# Patient Record
Sex: MALE | Race: ASIAN | NOT HISPANIC OR LATINO | Employment: FULL TIME | URBAN - METROPOLITAN AREA
[De-identification: names, ages, dates, MRNs, and addresses within clinical notes are randomized per-mention and may not be internally consistent; named-entity substitution may affect disease eponyms.]

---

## 2017-11-08 ENCOUNTER — OFFICE VISIT (OUTPATIENT)
Dept: URGENT CARE | Facility: URGENT CARE | Age: 27
End: 2017-11-08
Payer: COMMERCIAL

## 2017-11-08 VITALS
HEART RATE: 68 BPM | OXYGEN SATURATION: 99 % | SYSTOLIC BLOOD PRESSURE: 116 MMHG | DIASTOLIC BLOOD PRESSURE: 68 MMHG | TEMPERATURE: 99.2 F | WEIGHT: 157.4 LBS

## 2017-11-08 DIAGNOSIS — R23.8 VESICULAR RASH: Primary | ICD-10-CM

## 2017-11-08 PROCEDURE — 99203 OFFICE O/P NEW LOW 30 MIN: CPT | Performed by: PHYSICIAN ASSISTANT

## 2017-11-08 PROCEDURE — 87798 DETECT AGENT NOS DNA AMP: CPT | Performed by: PHYSICIAN ASSISTANT

## 2017-11-08 PROCEDURE — 36415 COLL VENOUS BLD VENIPUNCTURE: CPT | Performed by: PHYSICIAN ASSISTANT

## 2017-11-08 RX ORDER — VALACYCLOVIR HYDROCHLORIDE 1 G/1
1000 TABLET, FILM COATED ORAL 3 TIMES DAILY
Qty: 21 TABLET | Refills: 0 | Status: SHIPPED | OUTPATIENT
Start: 2017-11-08 | End: 2024-03-25

## 2017-11-08 RX ORDER — HYDROXYZINE HYDROCHLORIDE 25 MG/1
25-50 TABLET, FILM COATED ORAL EVERY 6 HOURS PRN
Qty: 60 TABLET | Refills: 0 | Status: SHIPPED | OUTPATIENT
Start: 2017-11-08 | End: 2024-03-25

## 2017-11-08 NOTE — LETTER
[place] [facility]  [address] [address]  [phone]      November 8, 2017    RE:  Zen Tenorio                                                                                                                                                       [address]  [facility] [address]            To whom it may concern:    Zen Tenorio is under my professional care for a potentially contagious vesicular rash.  He may return to work after all of the lesions have crusted over.  This may take up to a week.          Sincerely,        Zelda Greer [facility]

## 2017-11-08 NOTE — NURSING NOTE
Chief Complaint   Patient presents with     Derm Problem     rash and blisters all over body x 2 days        Initial /68  Pulse 68  Temp 99.2  F (37.3  C) (Oral)  Wt 157 lb 6.4 oz (71.4 kg)  SpO2 99% There is no height or weight on file to calculate BMI.  Medication Reconciliation: complete

## 2017-11-08 NOTE — PATIENT INSTRUCTIONS
Chickenpox (Adult)  Chickenpox is a very contagious illness caused by a virus. Symptoms include fever and an itchy red rash of small blisters that form all over the body. Blisters may appear on the scalp, face, and in the mouth. Women may also get blisters in the vaginal area. New blisters will appear over the first several days. The contagious period ends when all blisters have crusted over. This is usually about 1 week after the illness begins.  Most children older than 1 year and adults who have never had chickenpox can be vaccinated to prevent this illness.  The illness usually goes away in a few days, but the virus that causes chickenpox remains in the body. Many years or decades later this can result in shingles.   Home care  Follow these guidelines when caring for yourself at home:    You may use acetaminophen or ibuprofen to control pain and fever, unless another medicine was prescribed. If you have chronic liver or kidney disease, talk with your healthcare provider before using these medicines. Also talk with your provider if you ve had a stomach ulcer or GI bleeding. Don t give aspirin to anyone younger age 18 who is ill with a fever. It may cause severe liver damage.    You can relieve itching and pain by mixing cool water with cornstarch, baking soda, and commercial oatmeal bath powder. The oatmeal bath powder is available without a prescription. Use this mixture as a compress for the area. This will soothe the skin. Calamine or another anti-itch lotion may help.    Diphenhydramine is an antihistamine available at grocery stores and pharmacies. You can use this medicine to ease itching over large areas of skin unless a prescription antihistamine was given. Use lower doses during the day and higher doses at bedtime. This is because the drug may make you sleepy. Loratidine, cetirizine, or fexofenadine are other antihistamines that you may use. They cause less drowsiness and are good choices for daytime  use.    Bathe daily. Wash the rash with soap to stop infection.  Follow-up care  Follow up with your healthcare provider, or as advised, if the above tips don t bring relief.  When to seek medical advice  Call your healthcare provider right away if any of these occur:    Signs of skin infection. These include colored drainage from the sores and redness or tenderness of the sores that gets worse.    Excessive vomiting    Severe headache, stiff neck, or confusion    Joint pain, redness, or swelling    Dark urine, or yellow skin or eyes    Leg weakness or trouble walking    Fever of 100.4 F (38 C) or higher that doesn t get better with fever medicine      Date Last Reviewed: 1/1/2017 2000-2017 [employer]. [address] [place] [address], [place] [address]. All rights reserved. This information is not intended as a substitute for professional medical care. Always follow your healthcare professional's instructions.

## 2017-11-08 NOTE — MR AVS SNAPSHOT
After Visit Summary   11/8/2017    Zen Tenorio    MRN: [id number]           Patient Information     Date Of Birth          1990        Visit Information        Provider Department      11/8/2017 10:10 AM Zelda Ocampo PA-C [place] [facility]        Today's Diagnoses     Vesicular rash    -  1      Care Instructions      Chickenpox (Adult)  Chickenpox is a very contagious illness caused by a virus. Symptoms include fever and an itchy red rash of small blisters that form all over the body. Blisters may appear on the scalp, face, and in the mouth. Women may also get blisters in the vaginal area. New blisters will appear over the first several days. The contagious period ends when all blisters have crusted over. This is usually about 1 week after the illness begins.  Most children older than 1 year and adults who have never had chickenpox can be vaccinated to prevent this illness.  The illness usually goes away in a few days, but the virus that causes chickenpox remains in the body. Many years or decades later this can result in shingles.   Home care  Follow these guidelines when caring for yourself at home:    You may use acetaminophen or ibuprofen to control pain and fever, unless another medicine was prescribed. If you have chronic liver or kidney disease, talk with your healthcare provider before using these medicines. Also talk with your provider if you ve had a stomach ulcer or GI bleeding. Don t give aspirin to anyone younger age 18 who is ill with a fever. It may cause severe liver damage.    You can relieve itching and pain by mixing cool water with cornstarch, baking soda, and commercial oatmeal bath powder. The oatmeal bath powder is available without a prescription. Use this mixture as a compress for the area. This will soothe the skin. Calamine or another anti-itch lotion may help.    Diphenhydramine is an antihistamine available at grocery stores  and pharmacies. You can use this medicine to ease itching over large areas of skin unless a prescription antihistamine was given. Use lower doses during the day and higher doses at bedtime. This is because the drug may make you sleepy. Loratidine, cetirizine, or fexofenadine are other antihistamines that you may use. They cause less drowsiness and are good choices for daytime use.    Bathe daily. Wash the rash with soap to stop infection.  Follow-up care  Follow up with your healthcare provider, or as advised, if the above tips don t bring relief.  When to seek medical advice  Call your healthcare provider right away if any of these occur:    Signs of skin infection. These include colored drainage from the sores and redness or tenderness of the sores that gets worse.    Excessive vomiting    Severe headache, stiff neck, or confusion    Joint pain, redness, or swelling    Dark urine, or yellow skin or eyes    Leg weakness or trouble walking    Fever of 100.4 F (38 C) or higher that doesn t get better with fever medicine      Date Last Reviewed: 1/1/2017 2000-2017 [employer]. [address] [place] [address]. All rights reserved. This information is not intended as a substitute for professional medical care. Always follow your healthcare professional's instructions.                Follow-ups after your visit        Who to contact     If you have questions or need follow up information about today's clinic visit or your schedule please contact [facility] [place] directly at [phone].  Normal or non-critical lab and imaging results will be communicated to you by MyChart, letter or phone within 4 business days after the clinic has received the results. If you do not hear from us within 7 days, please contact the clinic through MyChart or phone. If you have a critical or abnormal lab result, we will notify you by phone as soon as possible.  Submit refill requests through  "[employer] or call your pharmacy and they will forward the refill request to us. Please allow 3 business days for your refill to be completed.          Additional Information About Your Visit        MyChart Information     [employer] lets you send messages to your doctor, view your test results, renew your prescriptions, schedule appointments and more. To sign up, go to [email or web address] . Click on \"Log in\" on the left side of the screen, which will take you to the Welcome page. Then click on \"Sign up Now\" on the right side of the page.     You will be asked to enter the access code listed below, as well as some personal information. Please follow the directions to create your username and password.     Your access code is: [email or web address]  Expires: 2018 11:45 AM     Your access code will  in 90 days. If you need help or a new code, please call your [place] clinic or [phone].        Care EveryWhere ID     This is your Care EveryWhere ID. This could be used by other organizations to access your [place] medical records  [id number]        Your Vitals Were     Pulse Temperature Pulse Oximetry             68 99.2  F (37.3  C) (Oral) 99%          Blood Pressure from Last 3 Encounters:   17 116/68    Weight from Last 3 Encounters:   17 157 lb 6.4 oz (71.4 kg)              We Performed the Following     Varicella Zoster DNA PCR CSF or Skin Swab          Today's Medication Changes          These changes are accurate as of: 17 11:45 AM.  If you have any questions, ask your nurse or doctor.               Start taking these medicines.        Dose/Directions    hydrOXYzine 25 MG tablet   Commonly known as:  ATARAX   Used for:  Vesicular rash   Started by:  Zelda Ocampo PA-C        Dose:  25-50 mg   Take 1-2 tablets (25-50 mg) by mouth every 6 hours as needed for itching   Quantity:  60 tablet   Refills:  0       valACYclovir 1000 mg tablet   Commonly known as:  VALTREX   Used for:  " Vesicular rash   Started by:  Zelda Ocampo PA-C        Dose:  1000 mg   Take 1 tablet (1,000 mg) by mouth 3 times daily   Quantity:  21 tablet   Refills:  0            Where to get your medicines      These medications were sent to [facility]/pharmacy #[id number] - [place], [place] - [address]  [address] [facility] [address]     Phone:  [phone]     hydrOXYzine 25 MG tablet    valACYclovir 1000 mg tablet                Primary Care Provider    Physician No Ref-Primary       NO REF-PRIMARY PHYSICIAN        Equal Access to Services     [facility]: Hadii aad ku hadasho Soomaali, waaxda luqadaha, qaybta kaalmada adeegyada, waxmari sunshine . So [facility] [phone].    ATENCIÓN: Si habla español, tiene a aviles disposición servicios gratuitos de asistencia lingüística. [facility] [phone].    We comply with applicable federal civil rights laws and [place] laws. We do not discriminate on the basis of race, color, national origin, age, disability, sex, sexual orientation, or gender identity.            Thank you!     Thank you for choosing [place] [facility]  for your care. Our goal is always to provide you with excellent care. Hearing back from our patients is one way we can continue to improve our services. Please take a few minutes to complete the written survey that you may receive in the mail after your visit with us. Thank you!             Your Updated Medication List - Protect others around you: Learn how to safely use, store and throw away your medicines at www.disposemymeds.org.          This list is accurate as of: 11/8/17 11:45 AM.  Always use your most recent med list.                   Brand Name Dispense Instructions for use Diagnosis    hydrOXYzine 25 MG tablet    ATARAX    60 tablet    Take 1-2 tablets (25-50 mg) by mouth every 6 hours as needed for itching    Vesicular rash       valACYclovir 1000 mg tablet    VALTREX    21  tablet    Take 1 tablet (1,000 mg) by mouth 3 times daily    Vesicular rash

## 2017-11-08 NOTE — PROGRESS NOTES
"SUBJECTIVE:  Zen Tenorio is a 27 year old male who presents to the clinic today for a rash.  Onset was 2 days ago, started on face with blisters, now blisters on trunk.    Fever started 3 days ago.  Low grade.    Rash is slightly itchy.   Previous history of a similar rash? No  Recent exposure history: none known  Recent new medications: None  Associated symptoms include: as above.    Patient did not have varicella as a child.  He moved to [place] from [place] [facility] for work 5 years ago.        No past medical history on file.     Denies any significant PMH     No Known Allergies  Social History   Substance Use Topics     Smoking status: Never Smoker     Smokeless tobacco: Not on file     Alcohol use Not on file       ROS:  CONSTITUTIONAL:as per HPI  INTEGUMENTARY/SKIN: as per HPi  EYES: NEGATIVE for vision changes or irritation  ENT/MOUTH: NEGATIVE for ear, mouth and throat problems  RESP:NEGATIVE for significant cough or SOB  CV: NEGATIVE for chest pain, palpitations or peripheral edema  GI: NEGATIVE for nausea, abdominal pain, heartburn, or change in bowel habits  : negative for dysuria, hematuria, decreased urinary stream, erectile dysfunction    EXAM:   /68  Pulse 68  Temp 99.2  F (37.3  C) (Oral)  Wt 157 lb 6.4 oz (71.4 kg)  SpO2 99%  GENERAL: alert, no acute distress.  SKIN: vesicular lesions \"dew drop on a castillo petal\" appearance, on trunk and face.    GENERAL APPEARANCE: healthy, alert and no distress  EYES: EOMI,  PERRL, conjunctiva clear  NECK: supple, non-tender to palpation, no adenopathy noted  RESP: lungs clear to auscultation - no rales, rhonchi or wheezes  CV: regular rates and rhythm, normal S1 S2, no murmur noted  ABDOMEN:  soft, nontender, no HSM or masses and bowel sounds normal    (R23.8) Vesicular rash  (primary encounter diagnosis)  Comment:   Plan: Varicella Zoster DNA PCR CSF or Skin Swab,         valACYclovir (VALTREX) 1000 mg tablet,         hydrOXYzine (ATARAX) 25 MG tablet, CANCELED: "         Varicella-Zoster Virus Culture ([facility])        Considered contagious.    Handout on Varicella given and reviewed.    Patient expresses understanding and agreement with the assessment and plan as above.

## 2017-11-09 LAB
SPECIMEN TYPE: ABNORMAL
VARICELLA ZOSTER DNA PCR COMMENT: ABNORMAL
VZV DNA SPEC QL NAA+PROBE: ABNORMAL

## 2017-11-16 ENCOUNTER — OFFICE VISIT (OUTPATIENT)
Dept: INTERNAL MEDICINE | Facility: CLINIC | Age: 27
End: 2017-11-16
Payer: COMMERCIAL

## 2017-11-16 VITALS
OXYGEN SATURATION: 100 % | WEIGHT: 145 LBS | HEART RATE: 63 BPM | SYSTOLIC BLOOD PRESSURE: 102 MMHG | HEIGHT: 69 IN | DIASTOLIC BLOOD PRESSURE: 70 MMHG | TEMPERATURE: 98.1 F | BODY MASS INDEX: 21.48 KG/M2

## 2017-11-16 DIAGNOSIS — B01.9 VARICELLA WITHOUT COMPLICATION: Primary | ICD-10-CM

## 2017-11-16 PROCEDURE — 99213 OFFICE O/P EST LOW 20 MIN: CPT | Performed by: PHYSICIAN ASSISTANT

## 2017-11-16 NOTE — MR AVS SNAPSHOT
"              After Visit Summary   2017    Zen Tenorio    MRN: [id number]           Patient Information     Date Of Birth          1990        Visit Information        Provider Department      2017 9:20 AM Alondra Dent PA-C [facility]        Today's Diagnoses     Varicella without complication    -  1       Follow-ups after your visit        Who to contact     If you have questions or need follow up information about today's clinic visit or your schedule please contact [facility] directly at [phone].  Normal or non-critical lab and imaging results will be communicated to you by [employer], letter or phone within 4 business days after the clinic has received the results. If you do not hear from us within 7 days, please contact the clinic through [employer] or phone. If you have a critical or abnormal lab result, we will notify you by phone as soon as possible.  Submit refill requests through [employer] or call your pharmacy and they will forward the refill request to us. Please allow 3 business days for your refill to be completed.          Additional Information About Your Visit        MyChart Information     [employer] lets you send messages to your doctor, view your test results, renew your prescriptions, schedule appointments and more. To sign up, go to [email or web address] . Click on \"Log in\" on the left side of the screen, which will take you to the Welcome page. Then click on \"Sign up Now\" on the right side of the page.     You will be asked to enter the access code listed below, as well as some personal information. Please follow the directions to create your username and password.     Your access code is: [email or web address]  Expires: 2018 11:45 AM     Your access code will  in 90 days. If you need help or a new code, please call your [facility] or [phone].        Care EveryWhere ID     This is your Care EveryWhere ID. " "This could be used by other organizations to access your [place] medical records  [id number]        Your Vitals Were     Pulse Temperature Height Pulse Oximetry BMI (Body Mass Index)       63 98.1  F (36.7  C) (Oral) 5' 9\" (1.753 m) 100% 21.41 kg/m2        Blood Pressure from Last 3 Encounters:   11/16/17 102/70   11/08/17 116/68    Weight from Last 3 Encounters:   11/16/17 145 lb (65.8 kg)   11/08/17 157 lb 6.4 oz (71.4 kg)              Today, you had the following     No orders found for display       Primary Care Provider    Physician No Ref-Primary       NO REF-PRIMARY PHYSICIAN        Equal Access to Services     [facility] : Hadii jared Craft, wawilfredo diggs, mao kaalmada kamaljit, keyona sunshine . So [facility] [phone].    ATENCIÓN: Si habla español, tiene a aviles disposición servicios gratuitos de asistencia lingüística. Llame al [phone].    We comply with applicable federal civil rights laws and [place] laws. We do not discriminate on the basis of race, color, national origin, age, disability, sex, sexual orientation, or gender identity.            Thank you!     Thank you for choosing [facility]  for your care. Our goal is always to provide you with excellent care. Hearing back from our patients is one way we can continue to improve our services. Please take a few minutes to complete the written survey that you may receive in the mail after your visit with us. Thank you!             Your Updated Medication List - Protect others around you: Learn how to safely use, store and throw away your medicines at www.disposemymeds.org.          This list is accurate as of: 11/16/17  2:17 PM.  Always use your most recent med list.                   Brand Name Dispense Instructions for use Diagnosis    hydrOXYzine 25 MG tablet    ATARAX    60 tablet    Take 1-2 tablets (25-50 mg) by mouth every 6 hours as needed for itching    Vesicular rash    "    valACYclovir 1000 mg tablet    VALTREX    21 tablet    Take 1 tablet (1,000 mg) by mouth 3 times daily    Vesicular rash

## 2017-11-16 NOTE — NURSING NOTE
"Chief Complaint   Patient presents with     Urgent Care     f/u       Initial /70  Pulse 63  Temp 98.1  F (36.7  C) (Oral)  Ht 5' 9\" (1.753 m)  Wt 145 lb (65.8 kg)  SpO2 100%  BMI 21.41 kg/m2 Estimated body mass index is 21.41 kg/(m^2) as calculated from the following:    Height as of this encounter: 5' 9\" (1.753 m).    Weight as of this encounter: 145 lb (65.8 kg).  Medication Reconciliation: complete   Kiarra Ramirez MA   "

## 2017-11-16 NOTE — PROGRESS NOTES
"  SUBJECTIVE:   Zen Tenorio is a 27 year old male who presents to clinic today for the following health issues:      ED/UC Followup:    Facility:  [facility]   Date of visit: 11/8/2017  Reason for visit: Varicella   Current Status: Patient only currently has one blister left on his R hand near his thumb. No itchiness or fever noted.   Pt was treated with oral antiviral.    Pt reports he has no idea how he contracted varicella. He notes significant improvement in his symptoms. He reports most of his lesions have crusted over except for the lesion on his right hand. He lives with roommates who have taken steps to get vaccinated. He has remained out of work. Pt's fever and weakness have resolved. He notes his energy seems to be back to normal.  He has no further concerns.     Problem list and histories reviewed & adjusted, as indicated.  Additional history: as documented      Reviewed and updated as needed this visit by clinical staff  Tobacco  Allergies  Meds  Problems       Reviewed and updated as needed this visit by Provider  Meds  Problems         ROS:  Constitutional, HEENT, cardiovascular, pulmonary, gi and gu systems are negative, except as otherwise noted.      OBJECTIVE:   /70  Pulse 63  Temp 98.1  F (36.7  C) (Oral)  Ht 5' 9\" (1.753 m)  Wt 145 lb (65.8 kg)  SpO2 100%  BMI 21.41 kg/m2  Body mass index is 21.41 kg/(m^2).  GENERAL APPEARANCE: healthy, alert and no distress  CV: RRR without rubs, gallops or murmurs  Resp: CTA B/L.   SKIN: lesions on abdomen are scabbed over with no blistering. Lesion on right thumb base is a clear blister on soft castillo colored lesion.     Review of chart shows positive varicella PCR    ASSESSMENT/PLAN:       1. Varicella without complication: healing well without concerns  -continue prevention precautions until all lesions are crusted over, reviewed criteria for not being contagious.   -reviewed risks of ariela the infection to immunocompromised or " unvaccinated individuals  -follow up if lesions start to reoccur, symptoms worsen or fail to completely resolve  -pt agreed to the above plan and all questions are answered     Alondra Dent PA-C  [facility]

## 2024-02-24 ENCOUNTER — HEALTH MAINTENANCE LETTER (OUTPATIENT)
Age: 34
End: 2024-02-24

## 2024-03-25 ENCOUNTER — OFFICE VISIT (OUTPATIENT)
Dept: FAMILY MEDICINE | Facility: CLINIC | Age: 34
End: 2024-03-25
Payer: COMMERCIAL

## 2024-03-25 VITALS
HEART RATE: 61 BPM | SYSTOLIC BLOOD PRESSURE: 113 MMHG | DIASTOLIC BLOOD PRESSURE: 75 MMHG | RESPIRATION RATE: 14 BRPM | BODY MASS INDEX: 23.92 KG/M2 | HEIGHT: 68 IN | TEMPERATURE: 97.7 F | WEIGHT: 157.8 LBS | OXYGEN SATURATION: 97 %

## 2024-03-25 DIAGNOSIS — Z13.220 SCREENING FOR HYPERLIPIDEMIA: ICD-10-CM

## 2024-03-25 DIAGNOSIS — Z23 NEED FOR TDAP VACCINATION: ICD-10-CM

## 2024-03-25 DIAGNOSIS — Z11.4 SCREENING FOR HIV (HUMAN IMMUNODEFICIENCY VIRUS): ICD-10-CM

## 2024-03-25 DIAGNOSIS — Z11.59 NEED FOR HEPATITIS C SCREENING TEST: ICD-10-CM

## 2024-03-25 DIAGNOSIS — Z13.1 SCREENING FOR DIABETES MELLITUS: ICD-10-CM

## 2024-03-25 DIAGNOSIS — Z23 NEED FOR COVID-19 VACCINE: ICD-10-CM

## 2024-03-25 DIAGNOSIS — Z00.00 ROUTINE GENERAL MEDICAL EXAMINATION AT A HEALTH CARE FACILITY: Primary | ICD-10-CM

## 2024-03-25 LAB
ANION GAP SERPL CALCULATED.3IONS-SCNC: 11 MMOL/L (ref 7–15)
BUN SERPL-MCNC: 16.6 MG/DL (ref 6–20)
CALCIUM SERPL-MCNC: 9.6 MG/DL (ref 8.6–10)
CHLORIDE SERPL-SCNC: 103 MMOL/L (ref 98–107)
CHOLEST SERPL-MCNC: 149 MG/DL
CREAT SERPL-MCNC: 1.02 MG/DL (ref 0.67–1.17)
DEPRECATED HCO3 PLAS-SCNC: 27 MMOL/L (ref 22–29)
EGFRCR SERPLBLD CKD-EPI 2021: >90 ML/MIN/1.73M2
ERYTHROCYTE [DISTWIDTH] IN BLOOD BY AUTOMATED COUNT: 12.8 % (ref 10–15)
FASTING STATUS PATIENT QL REPORTED: YES
GLUCOSE SERPL-MCNC: 70 MG/DL (ref 70–99)
HCT VFR BLD AUTO: 42.8 % (ref 40–53)
HCV AB SERPL QL IA: NONREACTIVE
HDLC SERPL-MCNC: 45 MG/DL
HGB BLD-MCNC: 15.5 G/DL (ref 13.3–17.7)
HIV 1+2 AB+HIV1 P24 AG SERPL QL IA: NONREACTIVE
LDLC SERPL CALC-MCNC: 93 MG/DL
MCH RBC QN AUTO: 29.9 PG (ref 26.5–33)
MCHC RBC AUTO-ENTMCNC: 36.2 G/DL (ref 31.5–36.5)
MCV RBC AUTO: 83 FL (ref 78–100)
NONHDLC SERPL-MCNC: 104 MG/DL
PLATELET # BLD AUTO: 142 10E3/UL (ref 150–450)
POTASSIUM SERPL-SCNC: 3.6 MMOL/L (ref 3.4–5.3)
RBC # BLD AUTO: 5.19 10E6/UL (ref 4.4–5.9)
SODIUM SERPL-SCNC: 141 MMOL/L (ref 135–145)
T4 FREE SERPL-MCNC: 1.4 NG/DL (ref 0.9–1.7)
TRIGL SERPL-MCNC: 54 MG/DL
TSH SERPL DL<=0.005 MIU/L-ACNC: 4.56 UIU/ML (ref 0.3–4.2)
WBC # BLD AUTO: 4 10E3/UL (ref 4–11)

## 2024-03-25 PROCEDURE — 80061 LIPID PANEL: CPT

## 2024-03-25 PROCEDURE — 90715 TDAP VACCINE 7 YRS/> IM: CPT

## 2024-03-25 PROCEDURE — 90471 IMMUNIZATION ADMIN: CPT

## 2024-03-25 PROCEDURE — 84443 ASSAY THYROID STIM HORMONE: CPT

## 2024-03-25 PROCEDURE — 91320 SARSCV2 VAC 30MCG TRS-SUC IM: CPT

## 2024-03-25 PROCEDURE — 80048 BASIC METABOLIC PNL TOTAL CA: CPT

## 2024-03-25 PROCEDURE — 84439 ASSAY OF FREE THYROXINE: CPT

## 2024-03-25 PROCEDURE — 99385 PREV VISIT NEW AGE 18-39: CPT | Mod: 25

## 2024-03-25 PROCEDURE — 86803 HEPATITIS C AB TEST: CPT

## 2024-03-25 PROCEDURE — 90480 ADMN SARSCOV2 VAC 1/ONLY CMP: CPT

## 2024-03-25 PROCEDURE — 87389 HIV-1 AG W/HIV-1&-2 AB AG IA: CPT

## 2024-03-25 PROCEDURE — 36415 COLL VENOUS BLD VENIPUNCTURE: CPT

## 2024-03-25 PROCEDURE — 85027 COMPLETE CBC AUTOMATED: CPT

## 2024-03-25 SDOH — HEALTH STABILITY: PHYSICAL HEALTH: ON AVERAGE, HOW MANY MINUTES DO YOU ENGAGE IN EXERCISE AT THIS LEVEL?: 60 MIN

## 2024-03-25 SDOH — HEALTH STABILITY: PHYSICAL HEALTH: ON AVERAGE, HOW MANY DAYS PER WEEK DO YOU ENGAGE IN MODERATE TO STRENUOUS EXERCISE (LIKE A BRISK WALK)?: 4 DAYS

## 2024-03-25 ASSESSMENT — LIFESTYLE VARIABLES
HOW MANY STANDARD DRINKS CONTAINING ALCOHOL DO YOU HAVE ON A TYPICAL DAY: 1 OR 2
HOW OFTEN DO YOU HAVE SIX OR MORE DRINKS ON ONE OCCASION: NEVER
HOW OFTEN DO YOU HAVE A DRINK CONTAINING ALCOHOL: MONTHLY OR LESS
AUDIT-C TOTAL SCORE: 1
SKIP TO QUESTIONS 9-10: 1

## 2024-03-25 ASSESSMENT — SOCIAL DETERMINANTS OF HEALTH (SDOH)
HOW OFTEN DO YOU GET TOGETHER WITH FRIENDS OR RELATIVES?: ONCE A WEEK
ARE YOU MARRIED, WIDOWED, DIVORCED, SEPARATED, NEVER MARRIED, OR LIVING WITH A PARTNER?: NEVER MARRIED
HOW OFTEN DO YOU ATTEND CHURCH OR RELIGIOUS SERVICES?: PATIENT DECLINED
HOW OFTEN DO YOU GET TOGETHER WITH FRIENDS OR RELATIVES?: ONCE A WEEK
IN A TYPICAL WEEK, HOW MANY TIMES DO YOU TALK ON THE PHONE WITH FAMILY, FRIENDS, OR NEIGHBORS?: ONCE A WEEK
HOW OFTEN DO YOU ATTENT MEETINGS OF THE CLUB OR ORGANIZATION YOU BELONG TO?: NEVER
DO YOU BELONG TO ANY CLUBS OR ORGANIZATIONS SUCH AS CHURCH GROUPS UNIONS, FRATERNAL OR ATHLETIC GROUPS, OR SCHOOL GROUPS?: NO

## 2024-03-25 ASSESSMENT — PAIN SCALES - GENERAL: PAINLEVEL: NO PAIN (0)

## 2024-03-25 NOTE — PATIENT INSTRUCTIONS
Preventive Care Advice   This is general advice given by our system to help you stay healthy. However, your care team may have specific advice just for you. Please talk to your care team about your preventive care needs.  Nutrition  Eat 5 or more servings of fruits and vegetables each day.  Try wheat bread, brown rice and whole grain pasta (instead of white bread, rice, and pasta).  Get enough calcium and vitamin D. Check the label on foods and aim for 100% of the RDA (recommended daily allowance).  Lifestyle  Exercise at least 150 minutes each week   (30 minutes a day, 5 days a week).  Do muscle strengthening activities 2 days a week. These help control your weight and prevent disease.  No smoking.  Wear sunscreen to prevent skin cancer.  Have a dental exam and cleaning every 6 months.  Yearly exams  See your health care team every year to talk about:  Any changes in your health.  Any medicines your care team has prescribed.  Preventive care, family planning, and ways to prevent chronic diseases.  Shots (vaccines)   HPV shots (up to age 26), if you've never had them before.  Hepatitis B shots (up to age 59), if you've never had them before.  COVID-19 shot: Get this shot when it's due.  Flu shot: Get a flu shot every year.  Tetanus shot: Get a tetanus shot every 10 years.  Pneumococcal, hepatitis A, and RSV shots: Ask your care team if you need these based on your risk.  Shingles shot (for age 50 and up).  General health tests  Diabetes screening:  Starting at age 35, Get screened for diabetes at least every 3 years.  If you are younger than age 35, ask your care team if you should be screened for diabetes.  Cholesterol test: At age 39, start having a cholesterol test every 5 years, or more often if advised.  Bone density scan (DEXA): At age 50, ask your care team if you should have this scan for osteoporosis (brittle bones).  Hepatitis C: Get tested at least once in your life.  STIs (sexually transmitted  infections)  Before age 24: Ask your care team if you should be screened for STIs.  After age 24: Get screened for STIs if you're at risk. You are at risk for STIs (including HIV) if:  You are sexually active with more than one person.  You don't use condoms every time.  You or a partner was diagnosed with a sexually transmitted infection.  If you are at risk for HIV, ask about PrEP medicine to prevent HIV.  Get tested for HIV at least once in your life, whether you are at risk for HIV or not.  Cancer screening tests  Cervical cancer screening: If you have a cervix, begin getting regular cervical cancer screening tests at age 21. Most people who have regular screenings with normal results can stop after age 65. Talk about this with your provider.  Breast cancer scan (mammogram): If you've ever had breasts, begin having regular mammograms starting at age 40. This is a scan to check for breast cancer.  Colon cancer screening: It is important to start screening for colon cancer at age 45.  Have a colonoscopy test every 10 years (or more often if you're at risk) Or, ask your provider about stool tests like a FIT test every year or Cologuard test every 3 years.  To learn more about your testing options, visit: https://www.[email or web address]/963225.pdf.  For help making a decision, visit: https://bit.ly/ms63422.  Prostate cancer screening test: If you have a prostate and are age 55 to 69, ask your provider if you would benefit from a yearly prostate cancer screening test.  Lung cancer screening: If you are a current or former smoker age 50 to 80, ask your care team if ongoing lung cancer screenings are right for you.  For informational purposes only. Not to replace the advice of your health care provider. Copyright   2023 [employer]. All rights reserved. Clinically reviewed by the [facility] Transitions Program. [employer] 406994 - REV 01/24.

## 2024-03-25 NOTE — PROGRESS NOTES
Preventive Care Visit  [facility]  Kenia Gaffney PA-C, Family Medicine  Mar 25, 2024    Assessment & Plan     (Z00.00) Routine general medical examination at a health care facility  (primary encounter diagnosis)  Stable exam. Routine screening labs, patient is fasting today. Updated Tdap and covid vaccinations in clinic today. Follow up in 1 year for annual wellness; sooner as needed for acute concerns.  Plan: Basic metabolic panel  (Ca, Cl, CO2, Creat,         Gluc, K, Na, BUN), CBC with platelets, Lipid         panel reflex to direct LDL Fasting, TSH with         free T4 reflex          (Z13.220) Screening for hyperlipidemia  Plan: Lipid panel reflex to direct LDL Fasting          (Z13.1) Screening for diabetes mellitus  Plan: Basic metabolic panel  (Ca, Cl, CO2, Creat,         Gluc, K, Na, BUN)          (Z11.4) Screening for HIV (human immunodeficiency virus)  Asymptomatic, getting as recommended screening.   Plan: HIV Antigen Antibody Combo          (Z11.59) Need for hepatitis C screening test  Asymptomatic, getting as recommended screening.   Plan: Hepatitis C Screen Reflex to HCV RNA Quant and         Genotype          (Z23) Need for Tdap vaccination  Plan: TDAP 7+ (ADACEL,BOOSTRIX)          (Z23) Need for COVID-19 vaccine  Plan: COVID-19 12+ (2023-24) (PFIZER)          Patient has been advised of split billing requirements and indicates understanding: Yes    Counseling  Appropriate preventive services were discussed with this patient, including applicable screening as appropriate for fall prevention, nutrition, physical activity, Tobacco-use cessation, weight loss and cognition.  Checklist reviewing preventive services available has been given to the patient.  Reviewed patient's diet, addressing concerns and/or questions.       Follow up in 1 year for physical; sooner with any acute concerns.     Preston Zaldivar is a 33 year old, presenting for the following:  Physical         3/25/2024     8:48 AM   Additional Questions   Roomed by Kendra Atkins        Health Care Directive  Patient does not have a Health Care Directive or Living Will: Discussed advance care planning with patient; information given to patient to review.    HPI        3/25/2024   General Health   How would you rate your overall physical health? Excellent   Feel stress (tense, anxious, or unable to sleep) Not at all    Not at all         3/25/2024   Nutrition   Three or more servings of calcium each day? (!) NO   Diet: Regular (no restrictions)   How many servings of fruit and vegetables per day? (!) 0-1   How many sweetened beverages each day? 0-1         3/25/2024   Exercise   Days per week of moderate/strenous exercise 4 days    4 days   Average minutes spent exercising at this level 60 min         3/25/2024   Social Factors   Frequency of gathering with friends or relatives Once a week    Once a week   Worry food won't last until get money to buy more No    No   Food not last or not have enough money for food? No    No   Do you have housing?  Yes    Yes   Are you worried about losing your housing? No    No   Lack of transportation? No    No   Unable to get utilities (heat,electricity)? No    No         3/25/2024   Dental   Dentist two times every year? Yes         3/25/2024   TB Screening   Were you born outside of the [place]? Yes       Today's PHQ-2 Score:       3/25/2024     8:42 AM   PHQ-2 ( 1999 Pf[employer])   Q1: Little interest or pleasure in doing things 0   Q2: Feeling down, depressed or hopeless 0   PHQ-2 Score 0   Q1: Little interest or pleasure in doing things Not at all   Q2: Feeling down, depressed or hopeless Not at all   PHQ-2 Score 0         3/25/2024   Substance Use   Frequency of drinking alcohol? Monthly or less   Alcohol more than 3/day or more than 7/wk No   Do you use any other substances recreationally? No     Social History     Tobacco Use    Smoking status: Never         3/25/2024   One time HIV  "Screening   Previous HIV test? No         3/25/2024   STI Screening   New sexual partner(s) since last STI/HIV test? No         3/25/2024   Contraception/Family Planning   Questions about contraception or family planning No     Reviewed and updated as needed this visit by Provider   Tobacco  Allergies  Meds  Problems  Med Hx  Surg Hx  Fam Hx            History reviewed. No pertinent past medical history.    History reviewed. No pertinent surgical history.    BP Readings from Last 3 Encounters:   03/25/24 113/75   11/16/17 102/70   11/08/17 116/68    Wt Readings from Last 3 Encounters:   03/25/24 71.6 kg (157 lb 12.8 oz)   11/16/17 65.8 kg (145 lb)   11/08/17 71.4 kg (157 lb 6.4 oz)         Patient Active Problem List   Diagnosis    Varicella without complication     History reviewed. No pertinent surgical history.    Social History     Tobacco Use    Smoking status: Never    Smokeless tobacco: Not on file   Substance Use Topics    Alcohol use: Not on file     History reviewed. No pertinent family history.      No current outpatient medications on file.     No Known Allergies      Review of Systems  Constitutional, HEENT, cardiovascular, pulmonary, GI, , musculoskeletal, neuro, skin, endocrine and psych systems are negative, except as otherwise noted.       Objective    Exam  /75 (BP Location: Right arm, Patient Position: Sitting, Cuff Size: Adult Regular)   Pulse 61   Temp 97.7  F (36.5  C) (Oral)   Resp 14   Ht 1.727 m (5' 8\")   Wt 71.6 kg (157 lb 12.8 oz)   SpO2 97%   BMI 23.99 kg/m     Estimated body mass index is 23.99 kg/m  as calculated from the following:    Height as of this encounter: 1.727 m (5' 8\").    Weight as of this encounter: 71.6 kg (157 lb 12.8 oz).    Physical Exam  GENERAL: alert and no distress  EYES: Eyes grossly normal to inspection, PERRL and conjunctivae and sclerae normal  HENT: ear canals and TM's normal, nose and mouth without ulcers or lesions  NECK: no adenopathy, " no asymmetry, masses, or scars  RESP: lungs clear to auscultation - no rales, rhonchi or wheezes  CV: regular rate and rhythm, normal S1 S2, no S3 or S4, no murmur, click or rub, no peripheral edema  ABDOMEN: soft, nontender, no masses and bowel sounds normal  MS: no gross musculoskeletal defects noted, no edema  SKIN: no suspicious lesions or rashes  NEURO: Normal strength and tone, mentation intact and speech normal  PSYCH: mentation appears normal, affect normal/bright        Signed Electronically by: Kenia Gaffney PA-C

## 2025-04-26 ENCOUNTER — HEALTH MAINTENANCE LETTER (OUTPATIENT)
Age: 35
End: 2025-04-26